# Patient Record
Sex: FEMALE | Race: BLACK OR AFRICAN AMERICAN | NOT HISPANIC OR LATINO | Employment: OTHER | URBAN - METROPOLITAN AREA
[De-identification: names, ages, dates, MRNs, and addresses within clinical notes are randomized per-mention and may not be internally consistent; named-entity substitution may affect disease eponyms.]

---

## 2019-08-23 ENCOUNTER — HOSPITAL ENCOUNTER (EMERGENCY)
Facility: HOSPITAL | Age: 44
Discharge: HOME | End: 2019-08-23
Attending: EMERGENCY MEDICINE

## 2019-08-23 VITALS
HEART RATE: 82 BPM | SYSTOLIC BLOOD PRESSURE: 184 MMHG | OXYGEN SATURATION: 100 % | RESPIRATION RATE: 20 BRPM | HEIGHT: 67 IN | TEMPERATURE: 98 F | DIASTOLIC BLOOD PRESSURE: 95 MMHG | BODY MASS INDEX: 28.83 KG/M2 | WEIGHT: 183.69 LBS

## 2019-08-23 DIAGNOSIS — H62.41 OTOMYCOSIS OF RIGHT EAR: Primary | ICD-10-CM

## 2019-08-23 DIAGNOSIS — B36.9 OTOMYCOSIS OF RIGHT EAR: Primary | ICD-10-CM

## 2019-08-23 DIAGNOSIS — H66.001 ACUTE SUPPURATIVE OTITIS MEDIA OF RIGHT EAR WITHOUT SPONTANEOUS RUPTURE OF TYMPANIC MEMBRANE, RECURRENCE NOT SPECIFIED: ICD-10-CM

## 2019-08-23 LAB
B-HCG UR QL: NEGATIVE
CTP QC/QA: YES
POCT GLUCOSE: 108 MG/DL (ref 70–110)

## 2019-08-23 PROCEDURE — 25000003 PHARM REV CODE 250: Mod: ER | Performed by: PHYSICIAN ASSISTANT

## 2019-08-23 PROCEDURE — 99284 EMERGENCY DEPT VISIT MOD MDM: CPT | Mod: ER

## 2019-08-23 PROCEDURE — 81025 URINE PREGNANCY TEST: CPT | Mod: ER | Performed by: EMERGENCY MEDICINE

## 2019-08-23 RX ORDER — FLUCONAZOLE 150 MG/1
150 TABLET ORAL
Status: COMPLETED | OUTPATIENT
Start: 2019-08-23 | End: 2019-08-23

## 2019-08-23 RX ORDER — AMLODIPINE BESYLATE 5 MG/1
5 TABLET ORAL DAILY
Qty: 30 TABLET | Refills: 0 | Status: SHIPPED | OUTPATIENT
Start: 2019-08-23 | End: 2019-09-22

## 2019-08-23 RX ORDER — IBUPROFEN 400 MG/1
400 TABLET ORAL EVERY 6 HOURS PRN
Qty: 20 TABLET | Refills: 0 | Status: SHIPPED | OUTPATIENT
Start: 2019-08-23

## 2019-08-23 RX ORDER — AMOXICILLIN AND CLAVULANATE POTASSIUM 875; 125 MG/1; MG/1
1 TABLET, FILM COATED ORAL 2 TIMES DAILY
Qty: 20 TABLET | Refills: 0 | Status: SHIPPED | OUTPATIENT
Start: 2019-08-23 | End: 2019-09-02

## 2019-08-23 RX ORDER — CLOTRIMAZOLE 1 G/ML
SOLUTION TOPICAL
Qty: 10 ML | Refills: 0 | Status: SHIPPED | OUTPATIENT
Start: 2019-08-23

## 2019-08-23 RX ADMIN — FLUCONAZOLE 150 MG: 150 TABLET ORAL at 06:08

## 2019-08-23 NOTE — ED PROVIDER NOTES
"Encounter Date: 8/23/2019    SCRIBE #1 NOTE: I, Misa Velazquez, am scribing for, and in the presence of,  LEE Peralta. I have scribed the following portions of the note - Other sections scribed: HPI, ROS.       History     Chief Complaint   Patient presents with    Otalgia     right earache for 2 days  patient also out of BP meds     Peg Peters is a 43 y.o. female with HTN who presents to the ED complaining of bilateral ear pain, right worse than left, intermittent for the past few months. Pain is worse today. Reports an achy pain. Denies ear drainage, hearing loss, dental pain, mouth pain, or jaw pain. Denies past ear surgeries or PE tubes. She reports 8 months she received some ear drops after visiting the doctor for similar symptoms, but was told she did not have an ear infection. Denies headache, congestion, cough, fever, recent illness, neck pain, and neck stiffness.    She has been out of her blood pressure medication for "awhile". She used to be prescribed Lisinopril, but stopped due to swelling. She is not sure what HTN medication she takes now. Denies history of DM.    The history is provided by the patient.     Review of patient's allergies indicates:   Allergen Reactions    Lisinopril      swelling     Past Medical History:   Diagnosis Date    Hypertension      History reviewed. No pertinent surgical history.  History reviewed. No pertinent family history.  Social History     Tobacco Use    Smoking status: Current Every Day Smoker    Smokeless tobacco: Never Used   Substance Use Topics    Alcohol use: Not on file    Drug use: Not Currently     Review of Systems   Constitutional: Negative for fever.   HENT: Positive for ear pain. Negative for congestion, dental problem, ear discharge, facial swelling, hearing loss, rhinorrhea, sinus pressure, sinus pain and sore throat.    Eyes: Negative for visual disturbance.   Respiratory: Negative for cough and shortness of breath.  "   Cardiovascular: Negative for chest pain.   Musculoskeletal: Negative for neck pain and neck stiffness.   Neurological: Negative for headaches.   All other systems reviewed and are negative.      Physical Exam     Initial Vitals   BP Pulse Resp Temp SpO2   08/23/19 1758 08/23/19 1758 08/23/19 1758 08/23/19 1758 08/23/19 1901   (!) 222/118 85 20 98.8 °F (37.1 °C) 100 %      MAP       --                Physical Exam    Nursing note and vitals reviewed.  Constitutional: She appears well-developed and well-nourished. She is not diaphoretic. No distress.   HENT:   Head: Normocephalic and atraumatic.   Right ear canal with fungal appearance to exudate. No ear canal edema. TM with purulent effusion, dull.  No perforation.  No mastoid swelling or tenderness.   Eyes: Conjunctivae and EOM are normal. Pupils are equal, round, and reactive to light.   Neck: Normal range of motion. Neck supple. No tracheal deviation present.   Cardiovascular: Intact distal pulses.   Pulmonary/Chest: No stridor. No respiratory distress.   Abdominal: There is no tenderness.   Musculoskeletal: Normal range of motion. She exhibits no tenderness.   Lymphadenopathy:     She has no cervical adenopathy.   Neurological: She is alert and oriented to person, place, and time.   Skin: Skin is warm and dry. Capillary refill takes less than 2 seconds.   Psychiatric: She has a normal mood and affect. Her behavior is normal. Judgment and thought content normal.         ED Course   Procedures  Labs Reviewed   POCT URINE PREGNANCY   POCT GLUCOSE MONITORING CONTINUOUS   POCT GLUCOSE          Imaging Results    None          Medical Decision Making:   Differential Diagnosis:   Otitis media, otitis externa, eustachian tube dysfunction, viral illness  Clinical Tests:   Lab Tests: Ordered and Reviewed  ED Management:  Looks like a item mycosis or fungal infection of the external ear canal with a purulent effusion as well. Will discharge on Augmentin, in addition to  topical clotrimazole.  ENT follow-up.  Return precautions given.            Scribe Attestation:   Scribe #1: I performed the above scribed service and the documentation accurately describes the services I performed. I attest to the accuracy of the note.               Clinical Impression:     1. Otomycosis of right ear    2. Acute suppurative otitis media of right ear without spontaneous rupture of tympanic membrane, recurrence not specified            Disposition:   Disposition: Discharged  Condition: Stable                        Keshav Carpio PA-C  08/23/19 1930

## 2019-08-23 NOTE — DISCHARGE INSTRUCTIONS
You need to follow up with your primary care provider if you are unable to follow-up with ear nose and throat.    Take all medications as prescribed.  Use the Lotrimin solution as prescribed.  Ibuprofen as needed for pain. Norvasc daily for elevated blood pressure.  Discontinue Norvasc if you begin with lightheadedness or any other uncomfortable side effects.  Please return to this emergency department if you begin with fever, if symptoms persist or worsen despite treatment, if any other problems occur.

## 2020-02-22 ENCOUNTER — HOSPITAL ENCOUNTER (EMERGENCY)
Facility: HOSPITAL | Age: 45
Discharge: HOME | End: 2020-02-22
Attending: EMERGENCY MEDICINE
Payer: MEDICAID

## 2020-02-22 VITALS
BODY MASS INDEX: 31.39 KG/M2 | DIASTOLIC BLOOD PRESSURE: 78 MMHG | SYSTOLIC BLOOD PRESSURE: 123 MMHG | RESPIRATION RATE: 17 BRPM | HEIGHT: 67 IN | OXYGEN SATURATION: 100 % | TEMPERATURE: 98 F | HEART RATE: 60 BPM | WEIGHT: 200 LBS

## 2020-02-22 DIAGNOSIS — H65.91 OME (OTITIS MEDIA WITH EFFUSION), RIGHT: Primary | ICD-10-CM

## 2020-02-22 DIAGNOSIS — R09.81 NASAL CONGESTION: ICD-10-CM

## 2020-02-22 PROCEDURE — 99284 EMERGENCY DEPT VISIT MOD MDM: CPT | Mod: ER

## 2020-02-22 RX ORDER — LORATADINE 10 MG/1
10 TABLET ORAL DAILY
Qty: 7 TABLET | Refills: 0 | Status: SHIPPED | OUTPATIENT
Start: 2020-02-22 | End: 2020-02-29

## 2020-02-22 RX ORDER — FLUTICASONE PROPIONATE 50 MCG
1 SPRAY, SUSPENSION (ML) NASAL 2 TIMES DAILY PRN
Qty: 15 G | Refills: 0 | Status: SHIPPED | OUTPATIENT
Start: 2020-02-22

## 2020-02-22 NOTE — ED PROVIDER NOTES
Encounter Date: 2/22/2020    SCRIBE #1 NOTE: I, Emma Plaza, am scribing for, and in the presence of,  LEE Green. I have scribed the following portions of the note - Other sections scribed: HPI, ROS, PE.       History     Chief Complaint   Patient presents with    Otalgia     BILATERAL EAR ITCHING AND BURNING SINCE LAST NIGHT; STATES IT HAS BEEN INTERMITTENT FOR 1 YEAR; DX W FUNGAL INFECTION BY PCP LAST YEAR;      Peg Peters is a 44 y.o. female with history of HTN who presents to the ED complaining of recurrent bilateral ear itching and burning with rhinorrhea since last night.  Gradually worsening for 1 year.  Patient was diagnosed with a fungal infection in the ears last year by PCP. Denies congestion, sore throat, or fever.    The history is provided by the patient. No  was used.     Review of patient's allergies indicates:  No Known Allergies  Past Medical History:   Diagnosis Date    Hypertension      Past Surgical History:   Procedure Laterality Date    TUBAL LIGATION       No family history on file.  Social History     Tobacco Use    Smoking status: Former Smoker   Substance Use Topics    Alcohol use: Yes    Drug use: Never     Review of Systems   Constitutional: Negative for fever.   HENT: Positive for ear pain and rhinorrhea. Negative for congestion and sore throat.    Respiratory: Negative for shortness of breath.    All other systems reviewed and are negative.      Physical Exam     Initial Vitals [02/22/20 1632]   BP Pulse Resp Temp SpO2   (!) 151/88 66 18 97.9 °F (36.6 °C) 98 %      MAP       --         Physical Exam    Nursing note and vitals reviewed.  Constitutional: She appears well-developed and well-nourished. She is not diaphoretic. No distress.   HENT:   Head: Normocephalic and atraumatic.   None purulent effusion to the right TM.  Bony landmarks or discernible.  No erythema or swelling.  Ear canals and mastoids normal.  Nasal congestion present without sinus  tenderness, swelling or erythema.  Oropharynx normal.   Eyes: Conjunctivae and EOM are normal. Right eye exhibits no discharge. Left eye exhibits no discharge.   Neck: Normal range of motion. No tracheal deviation present. No JVD present.   Cardiovascular: Normal rate, regular rhythm and normal heart sounds. Exam reveals no friction rub.    No murmur heard.  Pulmonary/Chest: Breath sounds normal. No stridor. No respiratory distress. She has no wheezes. She has no rhonchi. She has no rales. She exhibits no tenderness.   Musculoskeletal: Normal range of motion.   Neurological: She is alert and oriented to person, place, and time.   Skin: Skin is warm and dry. No rash and no abscess noted. No erythema. No pallor.         ED Course   Procedures  Labs Reviewed - No data to display       Imaging Results    None          Medical Decision Making:   History:   Old Medical Records: I decided to obtain old medical records.      This is an emergent evaluation of a 44 y.o. female presenting to the ED for otalgia associated with nasal congestion. Denies trauma, fever, drainage from ear, and hearing loss. Afebrile. Patient is non-toxic.    Presentation consistent with OME. No TM perforation. No signs of acute bacterial etiology from the ear at this time, including for AOM, bullous myringitis, OE, and mastoiditis. I doubt referred pain from dental etiology, bacterial rhinosinusitis, and deep space head/neck infection. No evidence to suggest herpes zoster oticus or otomycosis. No signs of cholesteatoma. Less consistent with inner ear etiology, including for labyrinthitis and meniere's.      Discharged home with supportive care. Instructed to follow up with PCP and ENT for reevaluation and management of symptoms.     I discussed with the patient the diagnosis, treatment plan, indications for return to the emergency department, and for expected follow-up. The patient verbalized an understanding. The patient is asked if there are any  questions or concerns. We discuss the case, until all issues are addressed to the patients satisfaction. Patient understands and is agreeable to the plan.           Scribe Attestation:   Scribe #1: I performed the above scribed service and the documentation accurately describes the services I performed. I attest to the accuracy of the note.    Scribe attestation: I, Agusto Choi, personally performed the services described in this documentation. All medical record entries made by the scribe were at my direction and in my presence.  I have reviewed the chart and agree that the record reflects my personal performance and is accurate and complete                       Clinical Impression:     1. OME (otitis media with effusion), right    2. Nasal congestion                                Agusto Choi PA-C  02/22/20 [id number]

## 2020-03-09 ENCOUNTER — HOSPITAL ENCOUNTER (EMERGENCY)
Facility: HOSPITAL | Age: 45
Discharge: HOME | End: 2020-03-09
Attending: EMERGENCY MEDICINE
Payer: MEDICAID

## 2020-03-09 VITALS
OXYGEN SATURATION: 100 % | BODY MASS INDEX: 30.61 KG/M2 | DIASTOLIC BLOOD PRESSURE: 74 MMHG | TEMPERATURE: 99 F | SYSTOLIC BLOOD PRESSURE: 133 MMHG | HEART RATE: 78 BPM | RESPIRATION RATE: 18 BRPM | WEIGHT: 195 LBS | HEIGHT: 67 IN

## 2020-03-09 DIAGNOSIS — R20.2 PARESTHESIAS: Primary | ICD-10-CM

## 2020-03-09 LAB
B-HCG UR QL: NEGATIVE
CTP QC/QA: YES

## 2020-03-09 PROCEDURE — 99283 EMERGENCY DEPT VISIT LOW MDM: CPT | Mod: 25

## 2020-03-09 PROCEDURE — 81025 URINE PREGNANCY TEST: CPT | Performed by: NURSE PRACTITIONER

## 2020-03-09 RX ORDER — DEXTROMETHORPHAN HYDROBROMIDE, GUAIFENESIN 5; 100 MG/5ML; MG/5ML
650 LIQUID ORAL EVERY 8 HOURS PRN
Qty: 30 TABLET | Refills: 0 | Status: SHIPPED | OUTPATIENT
Start: 2020-03-09

## 2020-03-09 NOTE — ED PROVIDER NOTES
"Encounter Date: 3/9/2020    SCRIBE #1 NOTE: I, Hue Oviedo, am scribing for, and in the presence of,  LEE Iglesias. I have scribed the following portions of the note - Other sections scribed: HPI, ROS.       History     Chief Complaint   Patient presents with    Numbness     tingling on and off to bilat hands x months.      CC: Tingling  HPI: This is a 44 y.o. female with a PMHx of Hypertension who presents to the ED complaining of intermittent tingling to the fingers that started 3 months ago. She reports "it feels like my fingers aren't getting enough circulation" and "it feels like rubber bands are wrapped around them". She notes that the tips of her fingers intermittently turn purple. She denies having any symptoms currently. She states that the tingling lasts for about an hour. She denies taking any pain medication for treatment. She denies a FMHx of Rheumatoid Arthritis, Lupus, or Multiple Sclerosis. She notes occasional alcohol use and denies tobacco or drug use. She denies arm pain, headache, and weakness. No other associated symptoms.    The history is provided by the patient. No  was used.     Review of patient's allergies indicates:   Allergen Reactions    Lisinopril      swelling     Past Medical History:   Diagnosis Date    Hypertension      Past Surgical History:   Procedure Laterality Date    TUBAL LIGATION       History reviewed. No pertinent family history.  Social History     Tobacco Use    Smoking status: Former Smoker   Substance Use Topics    Alcohol use: Yes    Drug use: Never     Review of Systems   Constitutional: Negative for fever.   HENT: Negative for sore throat.    Respiratory: Negative for shortness of breath.    Cardiovascular: Negative for chest pain.   Gastrointestinal: Negative for nausea.   Genitourinary: Negative for dysuria.   Musculoskeletal: Negative for arthralgias, back pain and myalgias.   Skin: Positive for color change. Negative for rash. "   Neurological: Negative for weakness and headaches.        (+) Tingling (fingers)   Hematological: Does not bruise/bleed easily.       Physical Exam     Initial Vitals [03/09/20 1424]   BP Pulse Resp Temp SpO2   139/87 80 16 98.2 °F (36.8 °C) 100 %      MAP       --         Physical Exam    Vitals reviewed.  Constitutional: She appears well-developed and well-nourished. She is not diaphoretic. No distress.   HENT:   Head: Normocephalic and atraumatic.   Right Ear: External ear normal.   Left Ear: External ear normal.   Nose: Nose normal.   Eyes: Conjunctivae are normal. No scleral icterus.   Neck: Normal range of motion. Neck supple.   Cardiovascular: Normal rate, regular rhythm, normal heart sounds and intact distal pulses.   Pulmonary/Chest: Breath sounds normal. No respiratory distress. She has no wheezes. She has no rhonchi. She has no rales.   Musculoskeletal: Normal range of motion. She exhibits no edema or tenderness.   Neurological: She is alert and oriented to person, place, and time. She has normal strength. No sensory deficit.   Skin: Skin is warm and dry. Capillary refill takes less than 2 seconds. No rash noted. No erythema. No pallor.         ED Course   Procedures  Labs Reviewed   POCT URINE PREGNANCY   POCT GLUCOSE MONITORING CONTINUOUS          Imaging Results    None          Medical Decision Making:   Clinical Tests:   Lab Tests: Ordered and Reviewed  ED Management:  44-year-old female with intermittent paresthesias of bilateral fingers, sometimes noticing a purplish color.  This is been going on for 3 months.  Currently she is asymptomatic.  She has normal capillary refill time, skin color, and strong and equal pulses bilaterally. Suspected vasculitis versus neuropathy. She does not smoke.  No other constitutional symptoms. Will have patient follow up with PCP for further evaluation.  No indication for further emergent evaluation.            Scribe Attestation:   Scribe #1: I performed the above  scribed service and the documentation accurately describes the services I performed. I attest to the accuracy of the note.                          Clinical Impression:       ICD-10-CM ICD-9-CM   1. Paresthesias R20.2 782.0             ED Disposition Condition    Discharge Stable        ED Prescriptions     Medication Sig Dispense Start Date End Date Auth. Provider    acetaminophen (TYLENOL) 650 MG TbSR Take 1 tablet (650 mg total) by mouth every 8 (eight) hours as needed. 30 tablet 3/9/2020  Migue Bradford PA-C        Follow-up Information     Follow up With Specialties Details Why Contact Info    Primary care provider  Schedule an appointment as soon as possible for a visit in 1 week For further evaluation     [facility] Go to  If symptoms worsen [address]  [facility] [id number] [phone]                    Scribe attestation: I, Migue Bradford, personally performed the services described in this documentation. All medical record entries made by the scribe were at my direction and in my presence. I have reviewed the chart and agree that the record reflects my personal performance and is accurate and complete.                 Migue Bradford PA-C  03/09/20 [id number]

## 2020-03-09 NOTE — ED TRIAGE NOTES
Pt presents to ED with c/o intermittent numbness at hands for months. States hands feel normal right now. Denies chest pain, weakness, or dizziness. Nad noted. Pt AAOx4.